# Patient Record
Sex: FEMALE | Race: WHITE | Employment: FULL TIME | URBAN - METROPOLITAN AREA
[De-identification: names, ages, dates, MRNs, and addresses within clinical notes are randomized per-mention and may not be internally consistent; named-entity substitution may affect disease eponyms.]

---

## 2018-10-13 ENCOUNTER — HOSPITAL ENCOUNTER (OUTPATIENT)
Age: 58
Discharge: HOME | End: 2018-10-13
Payer: COMMERCIAL

## 2018-10-13 VITALS
RESPIRATION RATE: 18 BRPM | TEMPERATURE: 98 F | WEIGHT: 245 LBS | BODY MASS INDEX: 37.13 KG/M2 | HEART RATE: 88 BPM | OXYGEN SATURATION: 96 % | SYSTOLIC BLOOD PRESSURE: 143 MMHG | HEIGHT: 68 IN | DIASTOLIC BLOOD PRESSURE: 74 MMHG

## 2018-10-13 DIAGNOSIS — L08.9 TOE INFECTION: Primary | ICD-10-CM

## 2018-10-13 PROCEDURE — 99203 OFFICE O/P NEW LOW 30 MIN: CPT

## 2018-10-13 PROCEDURE — 99204 OFFICE O/P NEW MOD 45 MIN: CPT

## 2018-10-13 RX ORDER — CEPHALEXIN 500 MG/1
500 CAPSULE ORAL 4 TIMES DAILY
Qty: 40 CAPSULE | Refills: 0 | Status: SHIPPED | OUTPATIENT
Start: 2018-10-13 | End: 2018-10-23

## 2018-10-13 NOTE — ED PROVIDER NOTES
Patient presents with: Toe Pain      HPI:     Gurmeet Peterson is a 59year old female who presents today with a chief complaint of possible infection to the right great toe.   The patient states approximately 6 months ago she had a ganglion cyst removed from and Vital Signs Reviewed. Physical Exam:     There is a cystlike lesion present to the lateral aspect of the right great toe with some surrounding redness, swelling, and discomfort. No active bleeding or pus drainage.   The area of redness and swellin patient. See AVS for detailed discharge instructions for your condition today.     Follow Up with:  Podiatrist in [address] an appointment as soon as possible for a visit in 2 days

## 2018-10-13 NOTE — ED INITIAL ASSESSMENT (HPI)
PATIENT ARRIVED AMBULATORY TO ROOM. PATIENT STATES SHE HAD A CYST REMOVED FROM HER RIGHT GREAT TOE 6 MONTHS AGO. PATIENT STATES \"3 DAYS AGO I STARTED HAVING REDNESS, SWELLING AND THE START OF A SORE THERE\" PATIENT STATES \"I'M DIABETIC\" NO DRAINAGE.  NO